# Patient Record
Sex: FEMALE | Race: OTHER | HISPANIC OR LATINO | URBAN - METROPOLITAN AREA
[De-identification: names, ages, dates, MRNs, and addresses within clinical notes are randomized per-mention and may not be internally consistent; named-entity substitution may affect disease eponyms.]

---

## 2018-07-22 ENCOUNTER — EMERGENCY (EMERGENCY)
Facility: HOSPITAL | Age: 53
LOS: 1 days | Discharge: HOME | End: 2018-07-22
Attending: EMERGENCY MEDICINE | Admitting: EMERGENCY MEDICINE
Payer: MEDICAID

## 2018-07-22 VITALS
HEART RATE: 71 BPM | SYSTOLIC BLOOD PRESSURE: 133 MMHG | RESPIRATION RATE: 18 BRPM | OXYGEN SATURATION: 100 % | DIASTOLIC BLOOD PRESSURE: 79 MMHG | TEMPERATURE: 98 F

## 2018-07-22 PROCEDURE — 99283 EMERGENCY DEPT VISIT LOW MDM: CPT

## 2018-07-22 RX ORDER — IBUPROFEN 200 MG
600 TABLET ORAL ONCE
Qty: 0 | Refills: 0 | Status: COMPLETED | OUTPATIENT
Start: 2018-07-22 | End: 2018-07-22

## 2018-07-22 RX ADMIN — Medication 600 MILLIGRAM(S): at 19:52

## 2018-07-22 RX ADMIN — Medication 600 MILLIGRAM(S): at 18:04

## 2018-07-22 NOTE — ED PROVIDER NOTE - LOWER EXTREMITY EXAM, RIGHT
TENDERNESS/TTP over the R 1st anterior nail. Dried blood to the area. ~90% of nail is off the nail bed.

## 2018-07-22 NOTE — CONSULT NOTE ADULT - SUBJECTIVE AND OBJECTIVE BOX
Podiatry pager #: [phone]/ [phone]    Patient is a 53y old  Female who presents with a chief complaint of right hallux nail avulsion.    ED HPI: 52y/o otherwise healthy F presents to the ED with R 1st toe pain secondary to slamming it into corner of desk. Pt states nail is elevated with dried blood. She had some bleeding initially which has spontaneously resolved. Denies numbness/tingling, any other complaints. NKDA.      PAST MEDICAL & SURGICAL HISTORY:  Hypothyroidism  H/O tubal ligation  H/O breast augmentation  H/O  section: x4  History of abdominoplasty      MEDICATIONS  (STANDING):    MEDICATIONS  (PRN):      Allergies    No Known Allergies    Intolerances        VITALS:    Vital Signs Last 24 Hrs  T(C): 36.7 (2018 17:08), Max: 36.7 (2018 17:08)  T(F): 98 (2018 17:08), Max: 98 (2018 17:08)  HR: 71 (2018 17:08) (71 - 71)  BP: 133/79 (2018 17:08) (133/79 - 133/79)  BP(mean): --  RR: 18 (2018 17:08) (18 - 18)  SpO2: 100% (2018 17:08) (100% - 100%)    LABS:                CAPILLARY BLOOD GLUCOSE              LOWER EXTREMITY PHYSICAL EXAM:    Pulses palpable  Nail >75% avulsed distally, dried blood noted under nail  No lac noted    RADIOLOGY & ADDITIONAL STUDIES:

## 2018-07-22 NOTE — CONSULT NOTE ADULT - ASSESSMENT
54 y/o female w/ right hallux nail avulsion  -pt seen and evaluated in ED  -right hallucal nail >75% avulsed distally  -local hallux ring block with 5cc 1% lidocaine w/ hcl  -total nail avulsion of hallux with sterile suture removal kit  -nail bed flushed w/ copious amounts of sterile saline solution  -no lac was noted on nail bed  -nail bed dressed w/ xeroform and DSD  -instructed pt to keep dressing clean, dry, and intact for 1 day  -after tomorrow, soak w/ epsom salt and pat dry  -cover w/ bacitracin and band aid daily  -pt to follow up as an outpatient w/ Dr. Orourke. Call [phone] to make an appointment for 1 week.

## 2018-07-22 NOTE — ED PROVIDER NOTE - OBJECTIVE STATEMENT
54y/o otherwise healthy F presents to the ED with R 1st toe pain secondary to slamming it into corner of desk. Pt states nail is elevated with dried blood. She had some bleeding initially which has spontaneously resolved. Denies numbness/tingling, any other complaints. NKDA.

## 2018-07-22 NOTE — ED PROVIDER NOTE - PROGRESS NOTE DETAILS
EM Roque- Pt seen and eval by podiatry, removed nail, wrapped in sterile dressing,. no lac to nailbed. Pt stable for dc with outpatient f/u.

## 2020-09-25 ENCOUNTER — EMERGENCY (EMERGENCY)
Facility: HOSPITAL | Age: 55
LOS: 1 days | Discharge: HOME | End: 2020-09-25
Attending: STUDENT IN AN ORGANIZED HEALTH CARE EDUCATION/TRAINING PROGRAM | Admitting: EMERGENCY MEDICINE
Payer: COMMERCIAL

## 2020-09-25 VITALS
SYSTOLIC BLOOD PRESSURE: 128 MMHG | HEIGHT: 62 IN | TEMPERATURE: 98 F | HEART RATE: 72 BPM | RESPIRATION RATE: 16 BRPM | DIASTOLIC BLOOD PRESSURE: 73 MMHG | OXYGEN SATURATION: 100 %

## 2020-09-25 LAB
ALBUMIN SERPL ELPH-MCNC: 4.4 G/DL — SIGNIFICANT CHANGE UP (ref 3.3–5)
ALP SERPL-CCNC: 82 U/L — SIGNIFICANT CHANGE UP (ref 40–120)
ALT FLD-CCNC: 20 U/L — SIGNIFICANT CHANGE UP (ref 4–33)
ANION GAP SERPL CALC-SCNC: 12 MMO/L — SIGNIFICANT CHANGE UP (ref 7–14)
AST SERPL-CCNC: 15 U/L — SIGNIFICANT CHANGE UP (ref 4–32)
BASOPHILS # BLD AUTO: 0.04 K/UL — SIGNIFICANT CHANGE UP (ref 0–0.2)
BASOPHILS NFR BLD AUTO: 0.5 % — SIGNIFICANT CHANGE UP (ref 0–2)
BILIRUB SERPL-MCNC: 0.8 MG/DL — SIGNIFICANT CHANGE UP (ref 0.2–1.2)
BUN SERPL-MCNC: 12 MG/DL — SIGNIFICANT CHANGE UP (ref 7–23)
CALCIUM SERPL-MCNC: 9.6 MG/DL — SIGNIFICANT CHANGE UP (ref 8.4–10.5)
CHLORIDE SERPL-SCNC: 101 MMOL/L — SIGNIFICANT CHANGE UP (ref 98–107)
CO2 SERPL-SCNC: 25 MMOL/L — SIGNIFICANT CHANGE UP (ref 22–31)
CREAT SERPL-MCNC: 0.63 MG/DL — SIGNIFICANT CHANGE UP (ref 0.5–1.3)
EOSINOPHIL # BLD AUTO: 0.06 K/UL — SIGNIFICANT CHANGE UP (ref 0–0.5)
EOSINOPHIL NFR BLD AUTO: 0.8 % — SIGNIFICANT CHANGE UP (ref 0–6)
GLUCOSE SERPL-MCNC: 99 MG/DL — SIGNIFICANT CHANGE UP (ref 70–99)
HBA1C BLD-MCNC: 5.6 % — SIGNIFICANT CHANGE UP (ref 4–5.6)
HCT VFR BLD CALC: 40.8 % — SIGNIFICANT CHANGE UP (ref 34.5–45)
HGB BLD-MCNC: 13.6 G/DL — SIGNIFICANT CHANGE UP (ref 11.5–15.5)
IMM GRANULOCYTES NFR BLD AUTO: 0.3 % — SIGNIFICANT CHANGE UP (ref 0–1.5)
LYMPHOCYTES # BLD AUTO: 2.04 K/UL — SIGNIFICANT CHANGE UP (ref 1–3.3)
LYMPHOCYTES # BLD AUTO: 26.6 % — SIGNIFICANT CHANGE UP (ref 13–44)
MCHC RBC-ENTMCNC: 30.4 PG — SIGNIFICANT CHANGE UP (ref 27–34)
MCHC RBC-ENTMCNC: 33.3 % — SIGNIFICANT CHANGE UP (ref 32–36)
MCV RBC AUTO: 91.3 FL — SIGNIFICANT CHANGE UP (ref 80–100)
MONOCYTES # BLD AUTO: 0.49 K/UL — SIGNIFICANT CHANGE UP (ref 0–0.9)
MONOCYTES NFR BLD AUTO: 6.4 % — SIGNIFICANT CHANGE UP (ref 2–14)
NEUTROPHILS # BLD AUTO: 5.01 K/UL — SIGNIFICANT CHANGE UP (ref 1.8–7.4)
NEUTROPHILS NFR BLD AUTO: 65.4 % — SIGNIFICANT CHANGE UP (ref 43–77)
NRBC # FLD: 0 K/UL — SIGNIFICANT CHANGE UP (ref 0–0)
PLATELET # BLD AUTO: 304 K/UL — SIGNIFICANT CHANGE UP (ref 150–400)
PMV BLD: 10.8 FL — SIGNIFICANT CHANGE UP (ref 7–13)
POTASSIUM SERPL-MCNC: 4.5 MMOL/L — SIGNIFICANT CHANGE UP (ref 3.5–5.3)
POTASSIUM SERPL-SCNC: 4.5 MMOL/L — SIGNIFICANT CHANGE UP (ref 3.5–5.3)
PROT SERPL-MCNC: 7.5 G/DL — SIGNIFICANT CHANGE UP (ref 6–8.3)
RBC # BLD: 4.47 M/UL — SIGNIFICANT CHANGE UP (ref 3.8–5.2)
RBC # FLD: 12.8 % — SIGNIFICANT CHANGE UP (ref 10.3–14.5)
SARS-COV-2 RNA SPEC QL NAA+PROBE: SIGNIFICANT CHANGE UP
SODIUM SERPL-SCNC: 138 MMOL/L — SIGNIFICANT CHANGE UP (ref 135–145)
WBC # BLD: 7.66 K/UL — SIGNIFICANT CHANGE UP (ref 3.8–10.5)
WBC # FLD AUTO: 7.66 K/UL — SIGNIFICANT CHANGE UP (ref 3.8–10.5)

## 2020-09-25 PROCEDURE — 70481 CT ORBIT/EAR/FOSSA W/DYE: CPT | Mod: 26

## 2020-09-25 PROCEDURE — 99218: CPT

## 2020-09-25 RX ORDER — DIPHENHYDRAMINE HCL 50 MG
50 CAPSULE ORAL ONCE
Refills: 0 | Status: COMPLETED | OUTPATIENT
Start: 2020-09-25 | End: 2020-09-25

## 2020-09-25 RX ORDER — KETOROLAC TROMETHAMINE 30 MG/ML
15 SYRINGE (ML) INJECTION ONCE
Refills: 0 | Status: DISCONTINUED | OUTPATIENT
Start: 2020-09-25 | End: 2020-09-25

## 2020-09-25 RX ORDER — LEVOTHYROXINE SODIUM 125 MCG
75 TABLET ORAL DAILY
Refills: 0 | Status: DISCONTINUED | OUTPATIENT
Start: 2020-09-26 | End: 2020-09-28

## 2020-09-25 RX ORDER — KETOROLAC TROMETHAMINE 30 MG/ML
30 SYRINGE (ML) INJECTION EVERY 6 HOURS
Refills: 0 | Status: DISCONTINUED | OUTPATIENT
Start: 2020-09-25 | End: 2020-09-25

## 2020-09-25 RX ORDER — VANCOMYCIN HCL 1 G
1000 VIAL (EA) INTRAVENOUS ONCE
Refills: 0 | Status: COMPLETED | OUTPATIENT
Start: 2020-09-25 | End: 2020-09-25

## 2020-09-25 RX ORDER — ONDANSETRON 8 MG/1
4 TABLET, FILM COATED ORAL ONCE
Refills: 0 | Status: COMPLETED | OUTPATIENT
Start: 2020-09-25 | End: 2020-09-25

## 2020-09-25 RX ORDER — CEFTRIAXONE 500 MG/1
2000 INJECTION, POWDER, FOR SOLUTION INTRAMUSCULAR; INTRAVENOUS ONCE
Refills: 0 | Status: COMPLETED | OUTPATIENT
Start: 2020-09-25 | End: 2020-09-25

## 2020-09-25 RX ORDER — DIPHENHYDRAMINE HCL 50 MG
25 CAPSULE ORAL EVERY 4 HOURS
Refills: 0 | Status: DISCONTINUED | OUTPATIENT
Start: 2020-09-25 | End: 2020-09-28

## 2020-09-25 RX ORDER — ONDANSETRON 8 MG/1
4 TABLET, FILM COATED ORAL EVERY 4 HOURS
Refills: 0 | Status: DISCONTINUED | OUTPATIENT
Start: 2020-09-25 | End: 2020-09-28

## 2020-09-25 RX ADMIN — Medication 1 TABLET(S): at 21:20

## 2020-09-25 RX ADMIN — Medication 250 MILLIGRAM(S): at 12:32

## 2020-09-25 RX ADMIN — Medication 30 MILLIGRAM(S): at 23:11

## 2020-09-25 RX ADMIN — Medication 1 TABLET(S): at 21:21

## 2020-09-25 RX ADMIN — ONDANSETRON 4 MILLIGRAM(S): 8 TABLET, FILM COATED ORAL at 12:02

## 2020-09-25 RX ADMIN — Medication 15 MILLIGRAM(S): at 11:56

## 2020-09-25 RX ADMIN — Medication 125 MILLIGRAM(S): at 11:30

## 2020-09-25 RX ADMIN — Medication 50 MILLIGRAM(S): at 11:34

## 2020-09-25 RX ADMIN — Medication 15 MILLIGRAM(S): at 11:36

## 2020-09-25 RX ADMIN — CEFTRIAXONE 100 MILLIGRAM(S): 500 INJECTION, POWDER, FOR SOLUTION INTRAMUSCULAR; INTRAVENOUS at 11:37

## 2020-09-25 NOTE — ED ADULT NURSE REASSESSMENT NOTE - NS ED NURSE REASSESS COMMENT FT1
pt remains alert,oriented x3. reports itching , nausea not present. denies diff breathing. sleeping intermittently. will continue to monitor.

## 2020-09-25 NOTE — ED ADULT NURSE REASSESSMENT NOTE - NS ED NURSE REASSESS COMMENT FT1
pt returned from CT dept s/p CT scan of orbital area. Pt received IV contrast for test, returned to intake with #20 angio to left hand heplock. Pt started to complain of body itching to upper torso and several hive like patches noted to chest and arm. Pt denies any SOB, difficulty swallowing, itchy throat. Speech clear. breathing unlabored. Dr. Tamayo made aware and came to assess pt. Benadryl 50mg IVP and Solumedrol 125mg IVP given as ordered. Pt started with nausea and dry heaving several minutes after. Dr. Tamayo made aware again and Zofran ordered and given. Pt also receiving Ceftriaxone IVPB as ordered. Will continue to monitor. Allergy IV contrast added to allergies.

## 2020-09-25 NOTE — ED ADULT NURSE NOTE - CHIEF COMPLAINT QUOTE
Pt c/o L eye swelling, drainage, and pain since yesterday. Went to [facility] care and prescribed Erythromycin ointment, today has worse swelling. L eye is swollen shut. Hx: hypothyroid

## 2020-09-25 NOTE — ED ADULT NURSE NOTE - OBJECTIVE STATEMENT
Received pt into spot #13 via stretcher. Pt A/O x 4 calm cooperative. Talking on cellphone when I entered room. Presents with left eye swelling and pain with drainage. Pt states woke up yesterday morning with minimal discomfort to left eye but did not think anything of it until pain increased and more swelling. Went to Urgent care and given erythromycin ointment with no effects. Pt states woke up 5am with eye lids swollen and unable to open now. Swelling with some redness noted to periorbital are and some redness noted to forehead area. Denies fever/chills. Clear drainage dripping out of eye as pt is talking. Right eye also appears slightly pink . Pt noted to be wiping right eye at times due to increased moisture. Pt eval by Dr. Tamayo. Blood work ordered along with antibiotics. Pt taken to CT scan first.

## 2020-09-25 NOTE — ED PROVIDER NOTE - CLINICAL SUMMARY MEDICAL DECISION MAKING FREE TEXT BOX
55M w/ L eye swelling, pain, difficulty opening eye - although able to, and has intact ROM L eye and VA w/out pain w/ movement - will check CT orbit to r/o orbital cellulitis, although much more likely periorbital cellulitis - will likely observe in CDU for improvement on iv antibiotics

## 2020-09-25 NOTE — ED PROVIDER NOTE - PROGRESS NOTE DETAILS
Khadar Tamayo MD: patient w/ hives, pruritus s/p IV contrast administration- no sob, no throat swelling, no wheezing, no nausea- ordered benadryl and solumedrol Khadar Tamayo MD: d/w radiology attending Dr. Verduzco to clarify about read "Hyperenhancement involving the left orbital septum and periorbita", but also final impression "preseptal cellulitis" - Khadar Tamayo MD: d/w radiology attending Dr. Verduzco to clarify about read "Hyperenhancement involving the left orbital septum and periorbita", but also final impression "preseptal cellulitis" - they reported they see no orbital involvement, do see preseptal cellulitis, and some enhancement of the septum, d/w CDU Khadar Tamayo MD: d/w CDU PA - will put through to CDU

## 2020-09-25 NOTE — ED PROVIDER NOTE - PHYSICAL EXAMINATION
*GEN:   comfortable, in no acute distress, AOx3  *EYES:   L eye periorbital / eyelid swelling and tenderness to palpation, Visual acuity 20/40 L eye and 20/30 R eye; pupils equally round and reactive to light, extra-occular movements intact w/out any pain  *HEENT:   airway patent  *CV:   regular rate and rhythm  *RESP:   clear to auscultation bilaterally, non-labored  *ABD:   soft, non-tender  *:   no cva/flank tenderness  *EXTREM:   no MSK tenderness, full ROM throughout, no leg swelling  *SKIN:   dry, intact  *NEURO:   AOx3, no focal weakness or loss of sensation

## 2020-09-25 NOTE — ED PROVIDER NOTE - OBJECTIVE STATEMENT
55F w/ pmh hypothyroidism - p/w L eye pain and swelling x 2 days, went to urgent care yesterday told was told had conjunctivitis and given erythromycin ointment, however woke up today unable to open eye, w/ drainage from L eye, so came to ED, reports significant pain to L eye. Patient denies visual changes, denies any other complaints. No fever, n/v/d/c, chest / abd pain, cough, sob, dizziness, dysuria/hematuria. No recent travel, illness, or hospitalization.    Denies pain w/ eye movements, denies photophobia

## 2020-09-25 NOTE — ED PROVIDER NOTE - CROS ED ROS STATEMENT
Recommendations:  -   has been obtaining Labs and U/S, patient states he will continue.  CBC, CMP, PT INR every 6 months  -  Ultrasound of abdomen and AFP every 6 months, next due in January 2021.  -  Low salt in the diet, avoid canned, bottled and processed foods.  -  Continue current meds  -  Avoid alcohol, smoking, sedatives and meds with codeine.  -  Avoid high intake of Tylenol (more than 4 extra-strength pills in one day)  -  Call us if any bleeding, fevers, confusion, disorientation occur  -  Endoscopy: to be done by dr Ovalle  -  Transplant option not discussed, as well-compensated cirrhosis.  will evaluate when MELD >15.  -  Return in 1 year.      all other ROS negative except as per HPI

## 2020-09-25 NOTE — ED ADULT TRIAGE NOTE - CHIEF COMPLAINT QUOTE
Pt c/o L eye swelling, drainage, and pain since yesterday. Went to [facility] care and prescribed Erythromycin ointment, today has worse swelling. L eye is swollen shut. Hx: hypothyroid

## 2020-09-26 VITALS
TEMPERATURE: 98 F | SYSTOLIC BLOOD PRESSURE: 134 MMHG | RESPIRATION RATE: 18 BRPM | DIASTOLIC BLOOD PRESSURE: 56 MMHG | HEART RATE: 78 BPM | OXYGEN SATURATION: 100 %

## 2020-09-26 PROCEDURE — 99217: CPT

## 2020-09-26 RX ORDER — AZTREONAM 2 G
1 VIAL (EA) INJECTION
Qty: 14 | Refills: 0
Start: 2020-09-26 | End: 2020-10-02

## 2020-09-26 RX ADMIN — Medication 1 TABLET(S): at 06:36

## 2020-09-26 RX ADMIN — Medication 75 MICROGRAM(S): at 06:36

## 2020-09-26 NOTE — ED CDU PROVIDER INITIAL DAY NOTE - NS ED ROS FT
· CONSTITUTIONAL: no fever and no chills.  · EYES: - - -  · Eyes [+]: DISCHARGE, L eye pain, periorbital swelling  · Eyes [-]: no visual changes  · CARDIOVASCULAR: no chest pain and no edema.  · RESPIRATORY: no chest pain, no cough, and no shortness of breath.  · GASTROINTESTINAL: no abdominal pain, no bloating, no constipation, no diarrhea, no nausea and no vomiting.  · GENITOURINARY: no dysuria, no frequency, and no hematuria.  · MUSCULOSKELETAL: no back pain, no gout, no musculoskeletal pain, no neck pain, and no weakness.  · SKIN: no abrasions, no jaundice, no lesions, no pruritis, and no rashes.  · NEURO: no loss of consciousness, no gait abnormality, no headache, no sensory deficits, and no weakness.  · ROS STATEMENT: all other ROS negative except as per HPI

## 2020-09-26 NOTE — ED CDU PROVIDER INITIAL DAY NOTE - MEDICAL DECISION MAKING DETAILS
pt p/w preseptal cellulitis, stable vitals. ct ne gfor septal cellulitis, no pain with movt of eyes, no visual changes. vss. hd stable.

## 2020-09-26 NOTE — ED CDU PROVIDER SUBSEQUENT DAY NOTE - PROGRESS NOTE DETAILS
Pt notes significant improvement in pain and swelling.  She reports no difficulty opening eye.  No pain with eye movement.  No diplopia.  EOMI.  Pt medically stable for discharge.  Strict return precautions given.  Pt to follow up with PMD.  Reassessment performed and plan for discharge discussed with Dr. Gorman who agrees with disposition and discharge plan.

## 2020-09-26 NOTE — ED CDU PROVIDER DISPOSITION NOTE - CLINICAL COURSE
tess: pt sent to cdu for iv abx and observation for preseptal cellulitis left eye, received antibiotics overnight, much improved  pt doing well, much improved.. swelling and redness much improved.  pt understands to continue abx and fu with pmd

## 2020-09-26 NOTE — ED CDU PROVIDER SUBSEQUENT DAY NOTE - ATTENDING CONTRIBUTION TO CARE
tess: pt sent to cdu for iv abx and observation for preseptal cellulitis left eye, received antibiotics overnight, much improved  pt doing well, much improved.. swelling and redness much improved.  pt understands to continue abx and fu with pmd    I performed a history and physical exam of the patient and discussed their management with the resident and /or advanced care provider. I reviewed the resident and /or ACP's note and agree with the documented findings and plan of care. My medical decison making and observations are found above.

## 2020-09-26 NOTE — ED CDU PROVIDER DISPOSITION NOTE - PATIENT PORTAL LINK FT
You can access the FollowMyHealth Patient Portal offered by [facility] by registering at the following website: [email or web address]. By joining [employer]’s FollowMyHealth portal, you will also be able to view your health information using other applications (apps) compatible with our system.

## 2020-09-26 NOTE — ED CDU PROVIDER DISPOSITION NOTE - NSFOLLOWUPINSTRUCTIONS_ED_ALL_ED_FT
Advance activity as tolerated.  Continue all previously prescribed medications as directed unless otherwise instructed.  Take Augmentin one pill twice a day for 7 days.  Take Bactrim one pill twice a day for 7 days.  Follow up with your primary care physician in 48-72 hours- bring copies of your results.  Return to the ER for worsening or persistent symptoms, including but not limited to worsening/persistent pain, swelling, fevers, pain with eye movement, double vision, vision loss, and/or ANY NEW OR CONCERNING SYMPTOMS. If you have issues obtaining follow up, please call: [phone] to obtain a doctor or specialist who takes your insurance in your area.  You may call [phone] to make an appointment with the internal medicine clinic.

## 2020-09-26 NOTE — ED CDU PROVIDER SUBSEQUENT DAY NOTE - PHYSICAL EXAMINATION
CONSTITUTIONAL:  Well appearing, awake, alert, oriented to person, place, time/situation and in no apparent distress.  Pt. is objectively comfortable appearing and verbalizing in full, clear, effortless sentences.  ENMT: NC/AT.  Airway patent.  Nasal mucosa clear.  Moist mucous membranes.  Neck supple.  EYES:  Mild generalized left eye periorbital soft tissue swelling.  EOMI OU.  CARDIAC:  Normal rate, regular rhythm.  Heart sounds S1 S2.  No murmurs, gallops, or rubs.  RESPIRATORY:  Breath sounds clear and equal bilaterally.  No wheezes, no rales, no rhonchi.  GASTROINTESTINAL:  Abdomen soft, non-distended, non-tender.  No rebound, no guarding.  MUSCULOSKELETAL:  Range of motion is not limited.  Gait stable.    SKIN:  Skin color unremarkable.  Skin warm, dry, and intact.    PSYCHIATRIC:  Alert and oriented to person/place/time/situation.  Mood and affect WNL.  No apparent risk to self or others.

## 2020-09-26 NOTE — ED CDU PROVIDER INITIAL DAY NOTE - OBJECTIVE STATEMENT
55F w/ pmh hypothyroidism - p/w L eye pain and swelling x 2 days, went to urgent care yesterday told was told had conjunctivitis and given erythromycin ointment, however woke up today unable to open eye, w/ drainage from L eye, so came to ED, reports significant pain to L eye. Patient denies visual changes, denies any other complaints. No fever, n/v/d/c, chest / abd pain, cough, sob, dizziness, dysuria/hematuria. No recent travel, illness, or hospitalization.

## 2020-09-26 NOTE — ED CDU PROVIDER SUBSEQUENT DAY NOTE - HISTORY
56 yo female, PMH hypothyroidism, presented to the ED for atraumatic left eye pain/swelling x 2 days.  No hx/o visual changes.  Pt had been seen at an urgent care center the day prior to this ED visit and was diagnosed with conjunctivitis and Rx'd erythromycin ointment.  No hx/o fever or chills or other c/o.  In the ED, WBC 7.66, CMP unremarkable, CT orbit w/ IV contrast was performed: "....IMPRESSION: Left-sided orbital preseptal cellulitis without evidence of post septal extension. No abscess.".  Pt. was initially given Solu-medrol, Rocephin, and Vancomycin in the ED; pt was dispo'd to CDU for continued care plan:  Augmentin/Bactrim DS regimen BID, supportive care, general observation care / monitoring.  In the interim, pt objectively noted to be resting comfortably; no issues thus far.

## 2021-01-05 ENCOUNTER — RESULT REVIEW (OUTPATIENT)
Age: 56
End: 2021-01-05

## 2021-04-29 ENCOUNTER — EMERGENCY (EMERGENCY)
Facility: HOSPITAL | Age: 56
LOS: 1 days | Discharge: HOME | End: 2021-04-29
Attending: EMERGENCY MEDICINE | Admitting: EMERGENCY MEDICINE
Payer: MEDICAID

## 2021-04-29 VITALS
HEIGHT: 62 IN | SYSTOLIC BLOOD PRESSURE: 143 MMHG | TEMPERATURE: 98 F | RESPIRATION RATE: 18 BRPM | DIASTOLIC BLOOD PRESSURE: 82 MMHG | HEART RATE: 95 BPM | OXYGEN SATURATION: 100 %

## 2021-04-29 VITALS
HEART RATE: 71 BPM | TEMPERATURE: 98 F | OXYGEN SATURATION: 99 % | RESPIRATION RATE: 18 BRPM | DIASTOLIC BLOOD PRESSURE: 75 MMHG | SYSTOLIC BLOOD PRESSURE: 131 MMHG

## 2021-04-29 DIAGNOSIS — Z98.890 OTHER SPECIFIED POSTPROCEDURAL STATES: Chronic | ICD-10-CM

## 2021-04-29 PROCEDURE — 93971 EXTREMITY STUDY: CPT | Mod: 26,LT

## 2021-04-29 PROCEDURE — 99284 EMERGENCY DEPT VISIT MOD MDM: CPT

## 2021-04-29 RX ORDER — IBUPROFEN 200 MG
400 TABLET ORAL ONCE
Refills: 0 | Status: COMPLETED | OUTPATIENT
Start: 2021-04-29 | End: 2021-04-29

## 2021-04-29 RX ADMIN — Medication 400 MILLIGRAM(S): at 19:57

## 2021-04-29 NOTE — ED PROVIDER NOTE - PATIENT PORTAL LINK FT
You can access the FollowMyHealth Patient Portal offered by [facility] by registering at the following website: [email or web address]. By joining [employer]’s FollowMyHealth portal, you will also be able to view your health information using other applications (apps) compatible with our system.

## 2021-04-29 NOTE — ED PROVIDER NOTE - PSH
H/O arthroscopic knee surgery    H/O breast augmentation    H/O  section  x4  H/O tubal ligation    History of abdominoplasty

## 2021-04-29 NOTE — ED ADULT TRIAGE NOTE - CHIEF COMPLAINT QUOTE
Pt had knee surgery on rt knee on 4/19/2021. Pt c/o pain and swelling to rt knee since Saturday. Pt not currently on anti-coagulation, MD instructed to come to ED to r/o dvt in rt leg. Pt breathing even and unlabored, afebrile.

## 2021-04-29 NOTE — ED PROVIDER NOTE - NSFOLLOWUPINSTRUCTIONS_ED_ALL_ED_FT
you do not have a clot in your leg.    the pain might be from a musculoskeletal source as your gait may have changes with your surgery    your orthopedist expects to see you next wee.    You may take 400mg of ibuprofen (example: motrin or advil) every 4-6 hours for baseline pain control as indicated with respect to the warnings on the label. This is an over the counter medication.    You may take 1000mg of Tylenol every 6 hours for baseline pain control with respect to the warnings on the label.    heating pads and warm soaks will help the muscle pain as well.    You are being discharged from the Emergency Department after evaluation of your presenting problem.  You were found not to have an emergency that requires hospitalization or surgery, but this does not mean you do not have a health concern.  You should follow up with your primary care physician and any other physicians suggested at time of discharge.  Also, if your condition worsens or changes know that the emergency department is open and available 24 hours a day/ 7 days a week and you should return to us if you have concerns. Thank you for allowing us to participate in your care.

## 2021-04-29 NOTE — ED PROVIDER NOTE - PHYSICAL EXAMINATION
pt alert and can phonate well  h at/nc  perrl, conj clear, sclera anicteric,  neck supple  throat no exudate  cor rrr pos s1s2  lungs clear to asno wheeze  abd soft no r/g/t  ext no edema no deformities.  pt with knee of intrest- right side, no tenderenss in tibial plateau, no redness/ watmth, pt can range knee well.  no clear difference in leg size, leg is well perfused, appropriately warm  neueo awake, lucid normal gait moves all extremities with strength  psych normal affect  vs reasonable

## 2021-06-24 NOTE — ED CDU PROVIDER INITIAL DAY NOTE - PMH
We will plan to see her again in 1 year.  Continue maximal medical therapy.  We will have a repeat PVR at that time.    
Hypothyroidism

## 2022-11-03 NOTE — ED ADULT NURSE NOTE - NSSEPSISSUSPECTED_ED_A_ED
No Opzelura Pregnancy And Lactation Text: There is insufficient data to evaluate drug-associated risk for major birth defects, miscarriage, or other adverse maternal or fetal outcomes.  There is a pregnancy registry that monitors pregnancy outcomes in pregnant persons exposed to the medication during pregnancy.  It is unknown if this medication is excreted in breast milk.  Do not breastfeed during treatment and for about 4 weeks after the last dose.

## 2022-11-24 NOTE — ED PROVIDER NOTE - OBJECTIVE STATEMENT
tess: pt presents 5 weeks after meniscal surgery on right knee with sensation of increased swelling in that leg. she notes more swelling today, but might have been going on a while.  she denies being sob or cp.   pt with hx of hypothyroidism on synthroid.  had surgery at [facility] with karen Mendez- [phone]. call placed to surgeon. pt denies fever, was able to go to pt this am, is not yet back to work.  pt is concerned she might have a clot.  of note, pt allergic to contrast dye previously here in this ed.      denies N/V/abd pain/ HA/ fever/ chest pain/ SOB/ dysuria/ urgency/ vaginal dc      denies N/V/abd pain/ HA/ fever/ chest pain/ SOB/ dysuria/ urgency/ vaginal dc/ extremity issue Yes

## 2023-06-05 NOTE — ED ADULT TRIAGE NOTE - CADM TRG TX PRIOR TO ARRIVAL
Family Medicine Clinic Visit    Date: 6/5/2023     Primary Care Provider: Vania Boggs MD     Reason for visit: Complete physical/preventative    HISTORY OF PRESENT ILLNESS: Lulu Riggs is a 52 year old female presents for the following:    Physical:  HTN - BP at goal with losartan  Anxiety - she is taking her paroxetine regularly and feels stable  She is going to be working on her weight loss with increased cardio, she works from home    PAST MEDICAL HISTORY:  Past Medical History:   Diagnosis Date   • Allergy    • Arthritis    • Folliculitis    • Hidradenitis suppurativa    • Hx of colonoscopy 11/2010   • PONV (postoperative nausea and vomiting)    • Primary localized osteoarthrosis, hand 2/5/2018   • PVC (premature ventricular contraction)        MEDICATIONS:  Current Outpatient Medications   Medication Sig   • PARoxetine (PAXIL) 40 MG tablet Take 1 tablet by mouth daily.   • losartan (COZAAR) 50 MG tablet Take 1 tablet by mouth daily.   • LORazepam (ATIVAN) 0.5 MG tablet Take 1 tablet by mouth daily as needed for Anxiety.   • albuterol 108 (90 Base) MCG/ACT inhaler Inhale 2 puffs into the lungs every 4 hours as needed for Shortness of Breath or Wheezing.   • cyclobenzaprine (FLEXERIL) 5 MG tablet Take 1 tablet by mouth 3 times daily as needed for Muscle spasms.     No current facility-administered medications for this visit.       ALLERGIES:  ALLERGIES:   Allergen Reactions   • Prevacid    • Sulfa Antibiotics      hives       FAMILY HISTORY:  Family History   Problem Relation Age of Onset   • High blood pressure Mother    • High cholesterol Mother    • Heart disease Mother    • Osteoarthritis Mother    • Psychiatric Mother         anxiety   • Depression Mother    • Diabetes Mother    • Diabetes Father    • Kidney disease Father    • High blood pressure Father    • High cholesterol Father    • Heart disease Father    • Osteoarthritis Father    • Cancer Maternal Uncle         lung    • Cancer Maternal  Grandmother         pancreatic   • Hypertension Maternal Grandmother    • Cancer Maternal Grandfather         brain   • Cancer Paternal Grandmother         colon,breast 80       SOCIAL HISTORY:  Social History     Tobacco Use   • Smoking status: Never   • Smokeless tobacco: Never   Substance Use Topics   • Alcohol use: Yes     Comment: social   • Drug use: No       REVIEW OF SYSTEMS:  General: Denies: fever  HEENT: Denies: vision changes, hearing changes  Pulmonary: Denies: shortness of breath  Cardiovascular: Denies: chest pain, palpitations and syncope   Gastrointestinal: Denies: abdominal pain  Genitourinary: Denies: dysuria  Musculoskeletal: Denies: weakness  Skin: Denies: rashes and itching  Endocrine: Denies: heat or cold intolerance  Neurologic: Denies: loss of consciousness  Psychiatric: Denies: substance dependency/abuse    Physical:   Visit Vitals  /84 (BP Location: LUE - Left upper extremity, Patient Position: Sitting, Cuff Size: Large Adult)   Pulse 71   Temp 97.6 °F (36.4 °C) (Temporal)   Ht 5' 10\" (1.778 m)   Wt 117.9 kg (260 lb)   LMP 01/25/2013   SpO2 99%   BMI 37.31 kg/m²     Physical Exam   General - Patient is in no acute distress. Patient is conversing freely in full sentences.   HEENT - TM (Tympanic membranes) are clear without effusion or inflammation.  The posterior pharynx is pink without inflammation or exudate.  There is no thyromegaly. Pupils equally round and reactive to light. Sclerae are anicteric. Oropharyngeal mucous membranes are moist. Neck is soft and supple.   Cardiovascular - Regular rate and rhythm without additional heart sounds. No carotid bruits.   Pulmonary - Lungs are clear to auscultation bilaterally. No wheezes, rales or rhonchi.   Abdomen - Soft, non-tender and non-distended. Bowel sounds are normoactive. No guarding or rebound tenderness.   Musculoskeletal - Warm and well perfused. No cyanosis or edema.   Skin- No rashes or lesions.   Neurologic - Alert and  oriented to person, place and time. CNs (Cranial nerves) II-XII are intact. Strength, sensation, and coordination are intact.     Assessment and Plan   Lulu Riggs is a 52 year old female with the following:    Annual physical exam  (primary encounter diagnosis)  Comment: doing well overall  Plan: Comprehensive Metabolic Panel, Glycohemoglobin,        Lipid Panel With Reflex, Thyroid Stimulating         Hormone Reflex, Vitamin D -25 Hydroxy          Encounter for colorectal cancer screening  Plan: OPEN ACCESS COLONOSCOPY          Encounter for screening mammogram for malignant neoplasm of breast  Plan: MAMMO SCREENING BILATERAL          Benign essential hypertension  Comment: at goal  Plan: continue losartan    Generalized anxiety disorder  Comment: stable  Plan: continue paroxetine    Prediabetes  Comment: labs ordered today  Plan: Glycohemoglobin          Return annually for physical or as needed     Routine Health Maintenance: ordered  Anticipatory Guidance:  Weight loss    Vania Boggs MD   6/5/2023  [address]  [address]  T [phone]  F [phone]     none

## 2023-11-26 NOTE — ED PROVIDER NOTE - PRO INTERPRETER NEED 2
Discharge Planner Post-Acute Rehab OT:     Discharge Plan: Mod I mixed mobility at home, HCOT vs OP OT pending progress    Precautions: fall    Current Status:  ADLs:  Mobility:  CGAx1 with FWW to and from bathroom, okay for nursing to assist walking to and from bathroom with Ax1 using FWW, AFOS BLE  Grooming: IND with set up  Dressing: UB - IND with set up. LB- CGA. Feet - IND with set up, fig four. AFO Min A  Bathing: Transfer CGA SPT wc <> ETB. SBA bathing.  Toileting:Transfer SBA wc <> toilet grab bar/ toilet safety frame. Cares and c/m SBA standing gb or FWW.  IADLs: Previously IND med mgmt (adherence issues per chart) and financial mgmt and microwave meal prep with [employer] service; PCA 3 hours/week for home mgmt, Lyft for transportation.  Vision/Cognition: Glasses. Flat affect, extra time for processing, safety deficits in transfer set up noted.    Assessment: Session focused on ADL and dynamic standing task for balance and strengthening. Pt CGA with FWW to walk to and from bathroom, pt cleared to walk to/from bathroom with nursing Ax1 with FWW. Completed boxing activity for dynamic standing balance and strengthening, pt demonstrated improved balance at end with stagger stance.      Other Barriers to Discharge (DME, Family Training, etc):    Lives alone, dad lives in second level of duplex.   Mental health factors.   Two sisters local.  Clawfoot tub, cannot add grab bar; pt endorses unable to place gb at toilet d/t space -- will assess if photos provided.     DME: Manual wc, FWW, 4WW, tub bench.  Recommended: toilet safety frame, bath handle safety for tub.     Family training: Not scheduled at this time.    PALLAVI Gay [phone].     English

## 2023-12-28 NOTE — CONSULT NOTE ADULT - CONSULT REQUESTED BY NAME
Was not fasting at time of lab collection.  Decrease intake of cholesterol, saturated fat, and fried foods and monitor.  Handout on ways to prevent high cholesterol is provided to the patient.   ED

## 2025-02-20 NOTE — ED PROVIDER NOTE - CROS ED CARDIOVAS ALL NEG
ROMINA ROSARIO    Patient Age: 34 year old   Refill request by: My Chart Request    Refill to be: [facility]Prescribed to:  [facility]    Medication requested to be refilled:   methylpheniDATE (RITALIN) 10 MG tablet 30 tablet 0 1/8/2025 --    Sig - Route: Take 1 tablet by mouth daily.           Patient's next appointment is scheduled for about 6 months (around 7/23/2025).     Patient's last appointment with this Provider was 1.23.25               WEIGHT AND HEIGHT:   Wt Readings from Last 1 Encounters:   01/29/25 62.6 kg (138 lb)     Ht Readings from Last 1 Encounters:   01/29/25 5' 4\" (1.626 m)     BMI Readings from Last 1 Encounters:   01/29/25 23.69 kg/m²       ALLERGIES:  Patient has no known allergies.  Current Outpatient Medications   Medication Sig Dispense Refill    norgestimate-ethinyl estradiol (Estarylla) 0.25-35 MG-MCG per tablet Take 1 tablet by mouth daily. 84 tablet 4    buPROPion XL (WELLBUTRIN XL) 150 MG 24 hr tablet Take 2 tablets by mouth daily. 180 tablet 1    methylpheniDATE (RITALIN) 10 MG tablet Take 1 tablet by mouth daily. Dispense Quantity: Thirty 30 tablet 0    venlafaxine XR (EFFEXOR XR) 75 MG 24 hr capsule TAKE 1 CAPSULE BY MOUTH EVERY DAY 90 capsule 0    azelaic acid (FINACEA) 15 % gel Apply to affected areas of the face twice daily (morning and evening) 50 g 11    tazarotene (AVAGE) 0.1 % cream Apply topically nightly. 30 g 5    aluminum chloride (DRYSOL) 20 % topical solution Apply  topically nightly. at bedtime to the affected areas of axillae. - Topical       No current facility-administered medications for this visit.         PCP: Haylie Simms MD         INS: Payor: [employer] [place] / Plan: PPO [id number] / Product Type: O MISC   PATIENT ADDRESS:  [address] [id number]     negative...